# Patient Record
Sex: MALE | Race: WHITE | NOT HISPANIC OR LATINO | Employment: OTHER | URBAN - NONMETROPOLITAN AREA
[De-identification: names, ages, dates, MRNs, and addresses within clinical notes are randomized per-mention and may not be internally consistent; named-entity substitution may affect disease eponyms.]

---

## 2023-02-07 LAB
ESTIMATED AVERAGE GLUCOSE: 152
HBA1C MFR BLD: 6.9 %

## 2023-09-11 LAB
CHOLEST SERPL-MSCNC: 131 MG/DL (ref 0–200)
CREAT UR-MCNC: 71.8 MG/DL
ESTIMATED AVERAGE GLUCOSE: 188
HBA1C MFR BLD: 8.2 %
HDLC SERPL-MCNC: 37 MG/DL
LDLC SERPL CALC-MCNC: 59 MG/DL
MICROALB/CREAT RATIO: 72.42
MICROALBUMIN QUANT: 5.2
TRIGL SERPL-MCNC: 173 MG/DL

## 2023-11-13 LAB
ESTIMATED AVERAGE GLUCOSE: 157
HBA1C MFR BLD: 7.1 %

## 2024-02-07 LAB
CHOLEST SERPL-MSCNC: 131 MG/DL (ref 0–200)
CREAT UR-MCNC: 72.1 MG/DL
ESTIMATED AVERAGE GLUCOSE: 164
HBA1C MFR BLD: 7.3 %
HDLC SERPL-MCNC: 35 MG/DL
LDLC SERPL CALC-MCNC: 61 MG/DL
MICROALB/CREAT RATIO: 52.7
MICROALBUMIN QUANT: 3.8
TRIGL SERPL-MCNC: 175 MG/DL

## 2024-04-03 ENCOUNTER — OFFICE VISIT (OUTPATIENT)
Dept: FAMILY MEDICINE | Facility: CLINIC | Age: 74
End: 2024-04-03
Payer: MEDICARE

## 2024-04-03 ENCOUNTER — DOCUMENTATION ONLY (OUTPATIENT)
Dept: FAMILY MEDICINE | Facility: CLINIC | Age: 74
End: 2024-04-03

## 2024-04-03 VITALS
BODY MASS INDEX: 40.43 KG/M2 | HEART RATE: 51 BPM | WEIGHT: 315 LBS | OXYGEN SATURATION: 95 % | HEIGHT: 74 IN | DIASTOLIC BLOOD PRESSURE: 70 MMHG | TEMPERATURE: 97 F | RESPIRATION RATE: 18 BRPM | SYSTOLIC BLOOD PRESSURE: 138 MMHG

## 2024-04-03 DIAGNOSIS — G57.92 NEUROPATHY OF LEFT FOOT: ICD-10-CM

## 2024-04-03 DIAGNOSIS — B37.2: ICD-10-CM

## 2024-04-03 DIAGNOSIS — G47.33 OBSTRUCTIVE SLEEP APNEA SYNDROME: ICD-10-CM

## 2024-04-03 DIAGNOSIS — E11.69 DIABETES MELLITUS TYPE 2 IN OBESE: ICD-10-CM

## 2024-04-03 DIAGNOSIS — I48.0 PAROXYSMAL ATRIAL FIBRILLATION: ICD-10-CM

## 2024-04-03 DIAGNOSIS — E66.9 DIABETES MELLITUS TYPE 2 IN OBESE: ICD-10-CM

## 2024-04-03 DIAGNOSIS — M10.9 GOUT, UNSPECIFIED CAUSE, UNSPECIFIED CHRONICITY, UNSPECIFIED SITE: ICD-10-CM

## 2024-04-03 DIAGNOSIS — E11.9 TYPE 2 DIABETES MELLITUS WITHOUT COMPLICATION, WITHOUT LONG-TERM CURRENT USE OF INSULIN: Primary | ICD-10-CM

## 2024-04-03 DIAGNOSIS — I10 PRIMARY HYPERTENSION: ICD-10-CM

## 2024-04-03 DIAGNOSIS — M62.838 SPASM OF CERVICAL PARASPINOUS MUSCLE: ICD-10-CM

## 2024-04-03 PROBLEM — G47.30 SLEEP APNEA, UNSPECIFIED: Status: ACTIVE | Noted: 2024-04-03

## 2024-04-03 LAB — GLUCOSE SERPL-MCNC: 112 MG/DL (ref 70–110)

## 2024-04-03 PROCEDURE — 82962 GLUCOSE BLOOD TEST: CPT | Mod: RHCUB | Performed by: NURSE PRACTITIONER

## 2024-04-03 PROCEDURE — 99203 OFFICE O/P NEW LOW 30 MIN: CPT | Mod: ,,, | Performed by: NURSE PRACTITIONER

## 2024-04-03 RX ORDER — DILTIAZEM HYDROCHLORIDE 240 MG/1
240 CAPSULE, COATED, EXTENDED RELEASE ORAL DAILY
COMMUNITY
Start: 2024-01-10

## 2024-04-03 RX ORDER — RIVAROXABAN 20 MG/1
20 TABLET, FILM COATED ORAL DAILY
COMMUNITY
Start: 2024-01-10 | End: 2024-04-03 | Stop reason: SDUPTHER

## 2024-04-03 RX ORDER — RIVAROXABAN 20 MG/1
20 TABLET, FILM COATED ORAL DAILY
Qty: 90 TABLET | Refills: 1 | Status: SHIPPED | OUTPATIENT
Start: 2024-04-03

## 2024-04-03 RX ORDER — DUTASTERIDE 0.5 MG/1
0.5 CAPSULE, LIQUID FILLED ORAL DAILY
COMMUNITY
Start: 2024-03-27

## 2024-04-03 RX ORDER — ALLOPURINOL 100 MG/1
200 TABLET ORAL DAILY
COMMUNITY
Start: 2024-01-10

## 2024-04-03 RX ORDER — SIMVASTATIN 20 MG/1
20 TABLET, FILM COATED ORAL NIGHTLY
COMMUNITY
Start: 2024-02-28

## 2024-04-03 RX ORDER — EMPAGLIFLOZIN 25 MG/1
25 TABLET, FILM COATED ORAL EVERY MORNING
COMMUNITY
Start: 2024-03-18 | End: 2024-04-03 | Stop reason: SDUPTHER

## 2024-04-03 RX ORDER — OLMESARTAN MEDOXOMIL AND HYDROCHLOROTHIAZIDE 40/25 40; 25 MG/1; MG/1
1 TABLET ORAL DAILY
COMMUNITY
Start: 2024-01-19

## 2024-04-03 RX ORDER — AMOXICILLIN 500 MG
1 CAPSULE ORAL 2 TIMES DAILY
COMMUNITY

## 2024-04-03 RX ORDER — NAPROXEN SODIUM 220 MG/1
81 TABLET, FILM COATED ORAL DAILY
COMMUNITY

## 2024-04-03 RX ORDER — METOPROLOL SUCCINATE 100 MG/1
100 TABLET, EXTENDED RELEASE ORAL DAILY
COMMUNITY
Start: 2024-01-10

## 2024-04-03 RX ORDER — METFORMIN HYDROCHLORIDE 500 MG/1
1000 TABLET, EXTENDED RELEASE ORAL NIGHTLY
COMMUNITY
Start: 2024-02-29

## 2024-04-03 RX ORDER — EMPAGLIFLOZIN 25 MG/1
25 TABLET, FILM COATED ORAL EVERY MORNING
Qty: 90 TABLET | Refills: 1 | Status: SHIPPED | OUTPATIENT
Start: 2024-04-03

## 2024-04-03 NOTE — ASSESSMENT & PLAN NOTE
Diltiazem 24 H  mg daily. The current medical regimen is effective;  continue present plan and medications.

## 2024-04-03 NOTE — ASSESSMENT & PLAN NOTE
Xareltk 20 mg qpm, metoprolol 100 mg daily, aspirin 81 mg daily. The current medical regimen is effective;  continue present plan and medications. 2020 SOB with activity with aleksander. Seeing Dr Carlson. Doing well with medications.

## 2024-04-03 NOTE — PROGRESS NOTES
"SUBJECTIVE:  Leilani Mendoza is a 73 y.o. male here alone for establish care.    HPI  Patient presents to establish care. Wants to discuss getting Jardiance and Xarelto prescribed at [facility] pharmacy due to cost. Needs Hep C screening and foot exam. Patient states had colonoscopy at [facility] in 2022 or 2023 with 2 benign polyps. Will request records. Last eye exam a few weeks ago [facility] [place]. Will request records. States had flu, pneumonia, zoster, and tetanus vaccines. Patient currently on statin therapy.     Blayne's allergies, medications, history, and problem list were updated as appropriate.    Review of Systems   Musculoskeletal:  Positive for arthralgias and back pain.   Neurological:  Positive for numbness.      A comprehensive review of symptoms was completed and negative except as noted above.    OBJECTIVE:  Vital signs  Vitals:    04/03/24 1406 04/03/24 1409   BP: (!) 142/74 138/70   BP Location: Left arm Left arm   Patient Position: Sitting Sitting   Pulse: (!) 51    Resp: 18    Temp: 96.9 °F (36.1 °C)    TempSrc: Temporal    SpO2: 95%    Weight: (!) 149.7 kg (330 lb)    Height: 6' 2" (1.88 m)         Physical Exam  Vitals and nursing note reviewed.   Constitutional:       Appearance: He is obese.   HENT:      Head: Normocephalic.   Musculoskeletal:      Cervical back: Erythema and spasms present. No swelling. Pain with movement present. Decreased range of motion.      Lumbar back: Decreased range of motion.        Back:         Feet:    Feet:      Left foot:      Protective Sensation: 0 sites tested.  10 sites sensed.   Neurological:      Mental Status: He is alert.          Office Visit on 04/03/2024   Component Date Value Ref Range Status    POC Glucose 04/03/2024 112 (A)  70 - 110 MG/DL Final   Documentation Only on 04/03/2024   Component Date Value Ref Range Status    Hemoglobin A1C 02/07/2023 6.9  % Final    EST AVERAGE GLUCOSE 02/07/2023 152   Final    CREATININE " 09/11/2023 71.8   Final    MICROALBUMIN QUANT 09/11/2023 5.2   Final    MICROALB/CREAT RATIO 09/11/2023 72.42   Final    Cholesterol 09/11/2023 131  0 - 200 mg/dL Final    Triglycerides 09/11/2023 173  mg/dL Final    HDL 09/11/2023 37  mg/dL Final    LDL Cholesterol 09/11/2023 59  mg/dL Final    Hemoglobin A1C 09/11/2023 8.2  % Final    EST AVERAGE GLUCOSE 09/11/2023 188   Final    Hemoglobin A1C 11/13/2023 7.1  % Final    EST AVERAGE GLUCOSE 11/13/2023 157   Final          ASSESSMENT/PLAN:    1. Type 2 diabetes mellitus without complication, without long-term current use of insulin  Assessment & Plan:  Metformin  2 qhs, jardiance 25 mg daily in am.   Hemoglobin A1C   Date Value Ref Range Status   02/07/2024 7.3 % Final   11/13/2023 7.1 % Final   09/11/2023 8.2 % Final    Seeing Dr Davison for monitoring medication. The current medical regimen is effective;  continue present plan and medications. Rechecking in May and recommend GLP for weight loss and heart protection.         Orders:  -     POCT Glucose, Hand-Held Device    2. Primary hypertension  Assessment & Plan:  Diltiazem 24 H  mg daily. The current medical regimen is effective;  continue present plan and medications.        3. Paroxysmal atrial fibrillation  Assessment & Plan:  Xareltk 20 mg qpm, metoprolol 100 mg daily, aspirin 81 mg daily. The current medical regimen is effective;  continue present plan and medications. 2020 SOB with activity with flutter. Seeing Dr Carlson. Doing well with medications.         4. Gout, unspecified cause, unspecified chronicity, unspecified site  Assessment & Plan:  Allopurinal 100 mg 2 daily. The current medical regimen is effective;  continue present plan and medications.        5. Obstructive sleep apnea syndrome  Assessment & Plan:  CPAP with compliance at at least 6 hours night. Feeling rested.       6. Diabetes mellitus type 2 in obese  Assessment & Plan:  Metformin  2 qhs, jardiance 25 mg daily  in am.   Hemoglobin A1C   Date Value Ref Range Status   02/07/2024 7.3 % Final   11/13/2023 7.1 % Final   09/11/2023 8.2 % Final    Seeing Dr Davison for monitoring medication. The current medical regimen is effective;  continue present plan and medications. Rechecking in May and recommend GLP for weight loss and heart protection.           7. Spasm of cervical paraspinous muscle  Assessment & Plan:  Stretching exercises.       8. Neuropathy of left foot  Assessment & Plan:  Left 5 with absent neuropathy.       9. Nail bed candidiasis  Assessment & Plan:  Can soak feet in vinegar bath water to 3 times a week dry off well and then apply the topical treatment from the nail bed base to the distal area daily and if it returns can repeat the process.            Recent Results (from the past 24 hour(s))   POCT Glucose, Hand-Held Device    Collection Time: 04/03/24  2:17 PM   Result Value Ref Range    POC Glucose 112 (A) 70 - 110 MG/DL       Follow Up:  Follow up in about 6 months (around 10/3/2024).      Discussed the diagnosis, prognosis, plan of care, chronic nature of and indications for, risks and benefits of treatment for conditions.  Continue all medications as prescribed unless otherwise noted.   Call if patient develops other symptoms or if not better in 2-3 days and sooner if worse. Emphasized the importance of compliance with all recommendations, including medication use and timely follow up. Instructed to return as noted be or sooner if patient develops any other problems or if there are any other additional questions or concerns.

## 2024-04-03 NOTE — ASSESSMENT & PLAN NOTE
Allopurinal 100 mg 2 daily. The current medical regimen is effective;  continue present plan and medications.

## 2024-04-03 NOTE — ASSESSMENT & PLAN NOTE
Metformin  2 qhs, jardiance 25 mg daily in am.   Hemoglobin A1C   Date Value Ref Range Status   02/07/2024 7.3 % Final   11/13/2023 7.1 % Final   09/11/2023 8.2 % Final    Seeing Dr Davison for monitoring medication. The current medical regimen is effective;  continue present plan and medications. Rechecking in May and recommend GLP for weight loss and heart protection.

## 2024-04-03 NOTE — ASSESSMENT & PLAN NOTE
Can soak feet in vinegar bath water to 3 times a week dry off well and then apply the topical treatment from the nail bed base to the distal area daily and if it returns can repeat the process.

## 2024-08-30 LAB
% NEUTROPHILS (OHS): 73.9
ABS NRBC COUNT: 0
ABSOLUTE BASOPHIL: 0.07
ABSOLUTE EOSINOPHIL: 0.19
ABSOLUTE LYMPHOCYTE: 1.97
ABSOLUTE MONOCYTE: 0.81
ALBUMIN/GLOB SERPL ELPH: 1.3 {RATIO}
ALBUMIN: 4.2
ALP SERPL-CCNC: 102 U/L (ref 25–125)
ALT SERPL-CCNC: 16 U/L
ANION GAP (OHS): 11 MMOL/L (ref 8–16)
AST SERPL-CCNC: 18 U/L
BASOPHILS NFR BLD: 0.6 % (ref 0–3)
BILIRUB SERPL-MCNC: 0.6 MG/DL (ref 0.1–1.4)
BUN SERPL-MCNC: 22 MG/DL
BUN/CREAT RATIO: 17.5
CALCIUM SERPL-MCNC: 9.7 MG/DL
CARBON DIOXIDE, CO2: 28 MMOL/L
CHLORIDE: 102 MMOL/L
CHOLEST SERPL-MSCNC: 125 MG/DL (ref 0–200)
CREAT SERPL-MCNC: 1.3 MG/DL
CREAT UR-MCNC: 61 MG/DL
EOSINOPHIL NFR BLD: 1.6 %
ERYTHROCYTE [DISTWIDTH] IN BLOOD BY AUTOMATED COUNT: 46.5 %
GFR: 59.48
GLOBULIN: 3.2
GLUCOSE: 135
HDLC SERPL-MCNC: 40 MG/DL
HEMATOCRIT: 50.5
HEMOGLOBIN A1C: 7.9 % (ref ?–5.6)
HEMOGLOBIN: 16.7
IMM GRANULOCYTES # BLD AUTO: 0.06 K/UL (ref 0–0.04)
IMMATURE GRANULOCYTES: 0.5
LDL/HDL RATIO: 1.2
LDLC SERPL CALC-MCNC: 50 MG/DL
LYMPHOCYTES NFR BLD AUTO: 16.6 %
MCH RBC QN AUTO: 29.7 PG
MCHC RBC AUTO-ENTMCNC: 33.1 G/DL
MCV RBC AUTO: 89.7 FL
MICROALB/CREAT RATIO: 45.9
MICROALBUMIN QUANT: 2.8
MONO %: 6.8
NEUTROPHILS ABSOLUTE: 8.74
NRBC %: 0
PLATELETS: 197
POTASSIUM: 4.1 MMOL/L
PSA: 285
RBC # BLD AUTO: 5.63 10*6/UL
SODIUM BLD-SCNC: 141 MMOL/L (ref 137–147)
TOTAL PROTEIN: 7.4 G/DL (ref 6.4–8.2)
TRIGL SERPL-MCNC: 176 MG/DL
TSH: 1.56
WBC: 11.84

## 2024-10-08 ENCOUNTER — DOCUMENTATION ONLY (OUTPATIENT)
Dept: FAMILY MEDICINE | Facility: CLINIC | Age: 74
End: 2024-10-08

## 2024-10-08 ENCOUNTER — OFFICE VISIT (OUTPATIENT)
Dept: FAMILY MEDICINE | Facility: CLINIC | Age: 74
End: 2024-10-08
Payer: MEDICARE

## 2024-10-08 VITALS
BODY MASS INDEX: 40.43 KG/M2 | OXYGEN SATURATION: 96 % | HEART RATE: 84 BPM | TEMPERATURE: 98 F | WEIGHT: 315 LBS | SYSTOLIC BLOOD PRESSURE: 122 MMHG | RESPIRATION RATE: 20 BRPM | HEIGHT: 74 IN | DIASTOLIC BLOOD PRESSURE: 68 MMHG

## 2024-10-08 DIAGNOSIS — E11.69 DIABETES MELLITUS TYPE 2 IN OBESE: Primary | ICD-10-CM

## 2024-10-08 DIAGNOSIS — E66.9 DIABETES MELLITUS TYPE 2 IN OBESE: Primary | ICD-10-CM

## 2024-10-08 DIAGNOSIS — I48.0 PAROXYSMAL ATRIAL FIBRILLATION: ICD-10-CM

## 2024-10-08 DIAGNOSIS — E11.65 TYPE 2 DIABETES MELLITUS WITH HYPERGLYCEMIA, WITHOUT LONG-TERM CURRENT USE OF INSULIN: ICD-10-CM

## 2024-10-08 LAB — GLUCOSE SERPL-MCNC: 107 MG/DL (ref 70–110)

## 2024-10-08 PROCEDURE — 82962 GLUCOSE BLOOD TEST: CPT | Mod: RHCUB | Performed by: NURSE PRACTITIONER

## 2024-10-08 PROCEDURE — 99214 OFFICE O/P EST MOD 30 MIN: CPT | Mod: ,,, | Performed by: NURSE PRACTITIONER

## 2024-10-08 RX ORDER — MUPIROCIN 20 MG/G
OINTMENT TOPICAL 2 TIMES DAILY
COMMUNITY

## 2024-10-08 RX ORDER — RIVAROXABAN 20 MG/1
20 TABLET, FILM COATED ORAL DAILY
Qty: 90 TABLET | Refills: 1 | Status: SHIPPED | OUTPATIENT
Start: 2024-10-08

## 2024-10-08 RX ORDER — EMPAGLIFLOZIN 25 MG/1
25 TABLET, FILM COATED ORAL EVERY MORNING
Qty: 90 TABLET | Refills: 1 | Status: SHIPPED | OUTPATIENT
Start: 2024-10-08

## 2024-10-08 NOTE — PROGRESS NOTES
Patient ID: Leilani Mendoza  : 1950     Chief Complaint: Diabetes    Allergies: Patient is allergic to sulfa (sulfonamide antibiotics).     History of Present Illness:  The patient is a 74 y.o. White male who presents to clinic for evaluation and management with a chief complaint of Diabetes   HPI   Patient in clinic with follow-up on medications. Patient denies to monitoring sugar at home. Patient recently had labs done. States that blood results are all over the place only due to having a tooth infection at the time. Patient states that now tooth abscess is resolved. Patient states that he has been taking all medications as directed. Needing refills. Patient is also needing a refill on the cream for the fungus on toenails. Patient states that he got his flu vaccine last Tuesday from Dr. Harrington. Patient seen Dr. Chavez within last 3 months. Patient had colonoscopy last year in August in [place]. Tooth abscess recently with right     Past Medical History:  has a past medical history of Arthropathy, unspecified, Gout, unspecified, HTN (hypertension), Hyperplasia of prostate, Idiopathic progressive neuropathy, Insomnia, Mixed hyperlipidemia, Paroxysmal atrial fibrillation, Sleep apnea, unspecified, Spondylolisthesis, Type 2 diabetes mellitus without complications, and Unspecified hemorrhoids.    Social History:  reports that he has never smoked. He has never been exposed to tobacco smoke. He has never used smokeless tobacco. He reports that he does not currently use alcohol. He reports that he does not use drugs.    Care Team: Patient Care Team:  Jose Antonio Harrington MD (Inactive) as PCP - General (Family Medicine)     Current Medications:  Current Outpatient Medications   Medication Instructions    allopurinoL (ZYLOPRIM) 200 mg, Daily    aspirin 81 mg, Daily    diltiaZEM (CARDIZEM CD) 240 mg, Daily    dutasteride (AVODART) 0.5 mg, Daily    JARDIANCE 25 mg, Oral, Every morning     "metFORMIN (GLUCOPHAGE-XR) 1,000 mg, Nightly    metoprolol succinate (TOPROL-XL) 100 mg, Daily    mupirocin (BACTROBAN) 2 % ointment 2 times daily    olmesartan-hydrochlorothiazide (BENICAR HCT) 40-25 mg per tablet 1 tablet, Daily    omega-3 fatty acids/fish oil (FISH OIL-OMEGA-3 FATTY ACIDS) 300-1,000 mg capsule 1 capsule, 2 times daily    simvastatin (ZOCOR) 20 mg, Nightly    XARELTO 20 mg, Oral, Daily       Review of Systems   Constitutional:  Negative for activity change and unexpected weight change.   HENT:  Positive for hearing loss. Negative for rhinorrhea and trouble swallowing.    Eyes:  Negative for discharge and visual disturbance.   Respiratory:  Negative for chest tightness and wheezing.    Cardiovascular:  Negative for chest pain and palpitations.   Gastrointestinal:  Negative for blood in stool, constipation, diarrhea and vomiting.   Endocrine: Negative for polydipsia and polyuria.   Genitourinary:  Negative for difficulty urinating, hematuria and urgency.   Musculoskeletal:  Positive for arthralgias. Negative for joint swelling and neck pain.   Integumentary:  Positive for color change (toenails).   Neurological:  Negative for weakness and headaches.   Psychiatric/Behavioral:  Negative for confusion and dysphoric mood.         Visit Vitals  /68 (BP Location: Left arm, Patient Position: Sitting)   Pulse 84   Temp 98.4 °F (36.9 °C)   Resp 20   Ht 6' 2" (1.88 m)   Wt (!) 145.6 kg (321 lb)   SpO2 96%   BMI 41.21 kg/m²       Physical Exam  Cardiovascular:      Rate and Rhythm: Normal rate and regular rhythm.      Pulses: Normal pulses.      Heart sounds: Normal heart sounds.   Pulmonary:      Effort: Pulmonary effort is normal.      Breath sounds: Normal breath sounds.          Labs Reviewed:  Chemistry:  Lab Results   Component Value Date     08/30/2024    K 4.1 08/30/2024    BUN 22 08/30/2024    CREATININE 1.3 08/30/2024    CALCIUM 9.7 08/30/2024    ALKPHOS 102 08/30/2024    ALBUMIN 4.2 " 08/30/2024    AST 18 08/30/2024    ALT 16 08/30/2024    TSH 1.56 08/30/2024        Lab Results   Component Value Date    HGBA1C 7.9 (A) 08/30/2024        Hematology:  Lab Results   Component Value Date    WBC 11.84 08/30/2024    MCV 89.7 08/30/2024    EOSINOPHIL 1.6 08/30/2024    BASOPHIL 0.6 08/30/2024       Lipid Panel:  Lab Results   Component Value Date    CHOL 125 08/30/2024    HDL 40 08/30/2024    LDLCALC 50 08/30/2024    TRIG 176 08/30/2024        Assessment & Plan:  1. Diabetes mellitus type 2 in obese  Assessment & Plan:  Glucose up 7.9 last visit. Discussed GLP to help reach goal[place] [address] daily, jardiance 25 mg daily. Recheck blood sugar in 3 months. Increase vegetables in diet.     Orders:  -     POCT Glucose, Hand-Held Device  -     JARDIANCE 25 mg tablet; Take 1 tablet (25 mg total) by mouth every morning.  Dispense: 90 tablet; Refill: 1    2. Paroxysmal atrial fibrillation  -     XARELTO 20 mg Tab; Take 1 tablet (20 mg total) by mouth once daily.  Dispense: 90 tablet; Refill: 1    3. Type 2 diabetes mellitus with hyperglycemia, without long-term current use of insulin  Assessment & Plan:  Glucose up 7.9 last visit. Discussed GLP to help reach goal. [place] [address] daily, jardiance 25 mg daily. Recheck blood sugar in 3 months. Increase vegetables in diet.            Future Appointments   Date Time Provider Department Center   4/8/2025 10:00 AM Kylah Ramirez DNP [facility]       Follow up in about 6 months (around 4/8/2025). Call sooner if needed.    Kylah Ramirez DNP

## 2024-10-08 NOTE — ASSESSMENT & PLAN NOTE
Glucose up 7.9 last visit. Discussed GLP to help reach goal. Continue [place] ER [address] daily, jardiance 25 mg daily. Recheck blood sugar in 3 months. Increase vegetables in diet.

## 2025-04-01 LAB
CHOLEST SERPL-MCNC: 110 MG/DL
CHOLEST SERPL-MSCNC: 109 MG/DL (ref 0–200)
CREAT UR-MCNC: 61.7 MG/DL
ESTIMATED AVERAGE GLUCOSE: 167
HBA1C MFR BLD: 7.4 %
HDLC SERPL-MCNC: 34 MG/DL
LDL/HDL RATIO: 1.6
LDLC SERPL CALC-MCNC: 53 MG/DL (ref 0–160)
MICROALB/CREAT RATIO: 24.31
MICROALBUMIN QUANT: 1.5

## 2025-04-08 ENCOUNTER — DOCUMENTATION ONLY (OUTPATIENT)
Dept: FAMILY MEDICINE | Facility: CLINIC | Age: 75
End: 2025-04-08

## 2025-04-08 ENCOUNTER — OFFICE VISIT (OUTPATIENT)
Dept: FAMILY MEDICINE | Facility: CLINIC | Age: 75
End: 2025-04-08
Payer: MEDICARE

## 2025-04-08 VITALS
SYSTOLIC BLOOD PRESSURE: 130 MMHG | OXYGEN SATURATION: 98 % | WEIGHT: 310 LBS | HEART RATE: 51 BPM | HEIGHT: 74 IN | TEMPERATURE: 98 F | DIASTOLIC BLOOD PRESSURE: 78 MMHG | BODY MASS INDEX: 39.78 KG/M2

## 2025-04-08 DIAGNOSIS — I48.0 PAROXYSMAL ATRIAL FIBRILLATION: ICD-10-CM

## 2025-04-08 DIAGNOSIS — E11.65 TYPE 2 DIABETES MELLITUS WITH HYPERGLYCEMIA, WITHOUT LONG-TERM CURRENT USE OF INSULIN: Primary | ICD-10-CM

## 2025-04-08 DIAGNOSIS — I10 PRIMARY HYPERTENSION: ICD-10-CM

## 2025-04-08 DIAGNOSIS — S39.012A STRAIN OF MUSCLE, FASCIA AND TENDON OF LOWER BACK, INITIAL ENCOUNTER: ICD-10-CM

## 2025-04-08 DIAGNOSIS — E66.9 DIABETES MELLITUS TYPE 2 IN OBESE: ICD-10-CM

## 2025-04-08 DIAGNOSIS — E66.9 OBESITY (BMI 35.0-39.9 WITHOUT COMORBIDITY): ICD-10-CM

## 2025-04-08 DIAGNOSIS — E11.69 DIABETES MELLITUS TYPE 2 IN OBESE: ICD-10-CM

## 2025-04-08 LAB — GLUCOSE SERPL-MCNC: 123 MG/DL (ref 70–110)

## 2025-04-08 PROCEDURE — 82962 GLUCOSE BLOOD TEST: CPT | Mod: RHCUB | Performed by: NURSE PRACTITIONER

## 2025-04-08 PROCEDURE — 99214 OFFICE O/P EST MOD 30 MIN: CPT | Mod: ,,, | Performed by: NURSE PRACTITIONER

## 2025-04-08 RX ORDER — EMPAGLIFLOZIN 25 MG/1
25 TABLET, FILM COATED ORAL EVERY MORNING
Qty: 90 TABLET | Refills: 1 | Status: SHIPPED | OUTPATIENT
Start: 2025-04-08

## 2025-04-08 RX ORDER — RIVAROXABAN 20 MG/1
20 TABLET, FILM COATED ORAL DAILY
Qty: 90 TABLET | Refills: 1 | Status: SHIPPED | OUTPATIENT
Start: 2025-04-08

## 2025-04-08 RX ORDER — TIZANIDINE 4 MG/1
TABLET ORAL
COMMUNITY
Start: 2025-04-07

## 2025-04-08 RX ORDER — DOXYCYCLINE 100 MG/1
100 CAPSULE ORAL 2 TIMES DAILY
COMMUNITY
Start: 2025-04-07

## 2025-04-08 NOTE — ASSESSMENT & PLAN NOTE
Zanaflex 4 mg prn qhs with stretching exercises. Left straight leg raise with spasm. Stretching exercises.

## 2025-04-08 NOTE — ASSESSMENT & PLAN NOTE
Diltiazem 24 H  mg daily. The current medical regimen is effective;  continue present plan and medications

## 2025-04-08 NOTE — ASSESSMENT & PLAN NOTE
Heart healthy diet with smaller portions. Avoid sweet drinks. The patient's BMI has been recorded in the chart. The patient has been provided educational materials regarding the benefits of attaining and maintaining a normal weight. We will continue to address and follow this issue during follow up visits.

## 2025-04-08 NOTE — ASSESSMENT & PLAN NOTE
Xarelto 20 mg qpm, metoprolol 100 mg daily, aspirin 81 mg daily. The current medical regimen is effective;  continue present plan and medications. 2020 SOB with activity with flutter. Saw PCP yesterday. Seeing family provider, every 3 months. Doing well with medications.

## 2025-04-08 NOTE — ASSESSMENT & PLAN NOTE
Hgb A1c dropped to 7.4 from 7.9. jardiance 25 mg. Metformin xr 1000 mg qhs. Will notify with results of lab draw when available. Continue current treatment until results available.

## 2025-04-08 NOTE — PROGRESS NOTES
Patient ID: Leilani Mendoza  : 1950     Chief Complaint: Follow-up (6 month follow up for refills )    Allergies: Patient is allergic to sulfa (sulfonamide antibiotics).     History of Present Illness:  The patient is a 74 y.o. White male who presents to clinic for evaluation and management with a chief complaint of Follow-up (6 month follow up for refills )   History of Present Illness    CHIEF COMPLAINT:  Patient presents today for follow up of chronic medications and back pain.    BACK PAIN:  He reports recurrent back pain with episodes typically lasting 6 days to 2 weeks. He is currently taking Zanaflex for pain management.    DIABETES:  A1C has improved from 7.9 to 7.4. He takes Jardiance and prefers to take metformin separately.    CARDIOVASCULAR:  He has a history of irregular heart rhythm. Cardiac imaging revealed minimal age-appropriate plaque in the arteries. He denies history of bypass surgery or other heart disease.    SLEEP:  He uses CPAP for sleep apnea management and reports having adequate supplies. He sleeps heavily with the CPAP device.    CURRENT MEDICATIONS:  He takes Jardiance, Xarelto, aspirin, metformin, and Zanaflex.    LABS:  LDL cholesterol is 53. Kidney function tests show no evidence of new kidney damage.    SOCIAL HISTORY:  He denies history of tobacco or alcohol use.      ROS:  General: -fever, -chills, -fatigue, -weight gain, -weight loss, +intermittent breathing while sleeping  Eyes: -vision changes, -redness, -discharge  ENT: -ear pain, -nasal congestion, -sore throat, +difficulty hearing  Cardiovascular: -chest pain, -palpitations, -lower extremity edema  Respiratory: -cough, -shortness of breath  Gastrointestinal: -abdominal pain, -nausea, -vomiting, -diarrhea, -constipation, -blood in stool  Genitourinary: -dysuria, -hematuria, -frequency  Musculoskeletal: -joint pain, -muscle pain, +back pain  Skin: -rash, -lesion, +easy bruising  Neurological: -headache,  -dizziness, -numbness, -tingling  Psychiatric: -anxiety, -depression, -sleep difficulty          Past Medical History:  has a past medical history of Arthropathy, unspecified, Gout, unspecified, HTN (hypertension), Hyperplasia of prostate, Idiopathic progressive neuropathy, Insomnia, Mixed hyperlipidemia, Paroxysmal atrial fibrillation, Sleep apnea, unspecified, Spondylolisthesis, Type 2 diabetes mellitus without complications, and Unspecified hemorrhoids.    Social History:  reports that he has never smoked. He has never been exposed to tobacco smoke. He has never used smokeless tobacco. He reports that he does not currently use alcohol. He reports that he does not use drugs.    Care Team: Patient Care Team:  Jose Antonio Harrington MD (Inactive) as PCP - General (Family Medicine)     Current Medications:  Current Outpatient Medications   Medication Instructions    allopurinoL (ZYLOPRIM) 200 mg, Daily    aspirin 81 mg, Daily    diltiaZEM (CARDIZEM CD) 240 mg, Daily    doxycycline (VIBRAMYCIN) 100 mg, 2 times daily    dutasteride (AVODART) 0.5 mg, Daily    JARDIANCE 25 mg, Oral, Every morning    metFORMIN (GLUCOPHAGE-XR) 1,000 mg, Nightly    metoprolol succinate (TOPROL-XL) 100 mg, Daily    mupirocin (BACTROBAN) 2 % ointment 2 times daily    olmesartan-hydrochlorothiazide (BENICAR HCT) 40-25 mg per tablet 1 tablet, Daily    omega-3 fatty acids/fish oil (FISH OIL-OMEGA-3 FATTY ACIDS) 300-1,000 mg capsule 1 capsule, 2 times daily    simvastatin (ZOCOR) 20 mg, Nightly    tiZANidine (ZANAFLEX) 4 MG tablet Take by mouth.    XARELTO 20 mg, Oral, Daily       Review of Systems   HENT:  Positive for postnasal drip.    Respiratory:  Negative for chest tightness and wheezing.    Cardiovascular:  Negative for palpitations and leg swelling.   Gastrointestinal:  Negative for blood in stool and change in bowel habit.   Genitourinary:  Negative for bladder incontinence, difficulty urinating and hematuria.   Musculoskeletal:   shortness of breath/Code Sepsis "Positive for arthralgias and back pain.   Hematological:  Bruises/bleeds easily.   All other systems reviewed and are negative.       Visit Vitals  /78 (BP Location: Left arm, Patient Position: Sitting)   Pulse (!) 51   Temp 98.1 °F (36.7 °C)   Ht 6' 2" (1.88 m)   Wt (!) 140.6 kg (310 lb)   SpO2 98%   BMI 39.80 kg/m²       Physical Exam  Vitals and nursing note reviewed.   Constitutional:       Appearance: He is obese.   HENT:      Head: Normocephalic.      Mouth/Throat:      Mouth: Mucous membranes are moist.      Pharynx: Oropharynx is clear.   Cardiovascular:      Rate and Rhythm: Normal rate and regular rhythm.      Pulses: Normal pulses.      Heart sounds: Normal heart sounds.   Pulmonary:      Effort: Pulmonary effort is normal.      Breath sounds: Normal breath sounds.   Musculoskeletal:      Cervical back: No swelling, erythema or spasms. No pain with movement. Normal range of motion.      Lumbar back: Spasms and tenderness present. No edema or signs of trauma. Decreased range of motion. Positive left straight leg raise test. Negative right straight leg raise test.        Back:         Feet:    Feet:      Left foot:      Protective Sensation: 0 sites tested.  10 sites sensed.   Skin:     General: Skin is warm.      Findings: Bruising (forearms bilaterally) present.   Neurological:      Mental Status: He is alert and oriented to person, place, and time.          Labs Reviewed:  Chemistry:  Lab Results   Component Value Date     08/30/2024    K 4.1 08/30/2024    BUN 22 08/30/2024    CREATININE 1.3 08/30/2024    CALCIUM 9.7 08/30/2024    ALKPHOS 102 08/30/2024    ALBUMIN 4.2 08/30/2024    AST 18 08/30/2024    ALT 16 08/30/2024    TSH 1.56 08/30/2024        Lab Results   Component Value Date    HGBA1C 7.4 04/01/2025        Hematology:  Lab Results   Component Value Date    WBC 11.84 08/30/2024    MCV 89.7 08/30/2024    EOSINOPHIL 1.6 08/30/2024    BASOPHIL 0.6 08/30/2024       Lipid Panel:  Lab " Results   Component Value Date    CHOL 109 04/01/2025    HDL 34 04/01/2025    LDLCALC 53 04/01/2025    TRIG 110 04/01/2025        Assessment & Plan:  1. Type 2 diabetes mellitus with hyperglycemia, without long-term current use of insulin  Assessment & Plan:  Hgb A1c dropped to 7.4 from 7.9. jardiance 25 mg. Metformin xr 1000 mg qhs. Will notify with results of lab draw when available. Continue current treatment until results available.       Orders:  -     POCT Glucose, Hand-Held Device  -     JARDIANCE 25 mg tablet; Take 1 tablet (25 mg total) by mouth every morning.  Dispense: 90 tablet; Refill: 1    2. Paroxysmal atrial fibrillation  Assessment & Plan:  Xarelto 20 mg qpm, metoprolol 100 mg daily, aspirin 81 mg daily. The current medical regimen is effective;  continue present plan and medications. 2020 SOB with activity with flutter. Saw PCP yesterday. Seeing family provider, every 3 months. Doing well with medications.     Orders:  -     XARELTO 20 mg Tab; Take 1 tablet (20 mg total) by mouth once daily.  Dispense: 90 tablet; Refill: 1    3. Primary hypertension  Assessment & Plan:  Diltiazem 24 H  mg daily. The current medical regimen is effective;  continue present plan and medications       4. Strain of muscle, fascia and tendon of lower back, initial encounter  Assessment & Plan:  Zanaflex 4 mg prn qhs with stretching exercises. Left straight leg raise with spasm. Stretching exercises.      5. Diabetes mellitus type 2 in obese    6. Obesity (BMI 35.0-39.9 without comorbidity)  Assessment & Plan:  Heart healthy diet with smaller portions. Avoid sweet drinks. The patient's BMI has been recorded in the chart. The patient has been provided educational materials regarding the benefits of attaining and maintaining a normal weight. We will continue to address and follow this issue during follow up visits.            Assessment & Plan    E11.69, E66.9 Type 2 diabetes mellitus with other specified  complication  I48.0 Paroxysmal atrial fibrillation  E11.65 Type 2 diabetes mellitus with hyperglycemia, without long-term current use of insulin  I10 Primary hypertension  S39.012A Strain of muscle, fascia and tendon of lower back, initial encounter    IMPRESSION:  - Assessed recent fall and head injury, emphasizing importance of monitoring for delayed symptoms of concussion or intracranial bleeding, particularly given anticoagulation therapy.  - A1c improved from 7.9 to 7.4, indicating better glycemic control.  - LDL at 53, appropriate given cardiac history.  - Started Zanaflex for back pain management to be taken at night, balancing efficacy with potential side effects.  - Advised against NSAIDs and discontinued Aleve due to increased bleeding risk with concurrent Xarelto and aspirin therapy.  - Microalbumin showed no new kidney damage.    E11.69, E66.9 TYPE 2 DIABETES MELLITUS WITH OTHER SPECIFIED COMPLICATION:  - Continue Jardiance and metformin for diabetes management.  - Monitor A1c levels, noting a decrease from 13 to 7.4 following a fall and infection.  - Current A1c of 7.4 remains above target range.  - Assess impact of dietary indiscretions on blood sugar.  - Advise patient to keep medications separate for easier management.  - Provide medication for 6 months and schedule follow-up visits with labs.    I48.0 PAROXYSMAL ATRIAL FIBRILLATION:  - Continue Xarelto for anticoagulation therapy.  - Educate patient on risks associated with head injuries while on anticoagulants, including potential for delayed intracranial bleeding.  - Discuss potential sites of bleeding to monitor: GI, urinary, respiratory, and cutaneous.  - Advise against NSAIDs and discontinue Aleve due to increased bleeding risk with concurrent Xarelto and aspirin therapy.  - Review patient's cardiovascular history, noting no bypass or plumbing problems, and monitor history of irregular heart rhythm.    S39.012A STRAIN OF MUSCLE, FASCIA AND  TENDON OF LOWER BACK, INITIAL ENCOUNTER:  - Educate patient on proper pain management techniques: heat for muscle pain, ice for joint pain.  - Instruct patient to apply heat to back for muscle pain relief.  - Prescribe Zanaflex for back pain management to be taken at night, balancing efficacy with potential side effects.  - Prescribe Tylenol as needed for additional pain relief.          Future Appointments   Date Time Provider Department Center   10/8/2025 10:40 AM Kylah Ramirez DNP [facility]       No follow-ups on file. Call sooner if needed.      This note was generated with the assistance of ambient listening technology. Verbal consent was obtained by the patient and accompanying visitor(s) for the recording of patient appointment to facilitate this note. I attest to having reviewed and edited the generated note for accuracy, though some syntax or spelling errors may persist. Please contact the author of this note for any clarification.      Kylah Ramirez DNP